# Patient Record
Sex: MALE | Race: WHITE
[De-identification: names, ages, dates, MRNs, and addresses within clinical notes are randomized per-mention and may not be internally consistent; named-entity substitution may affect disease eponyms.]

---

## 2018-09-15 ENCOUNTER — HOSPITAL ENCOUNTER (INPATIENT)
Dept: HOSPITAL 47 - EC | Age: 78
LOS: 2 days | Discharge: HOME | DRG: 310 | End: 2018-09-17
Attending: INTERNAL MEDICINE | Admitting: INTERNAL MEDICINE
Payer: MEDICARE

## 2018-09-15 VITALS — BODY MASS INDEX: 34.9 KG/M2

## 2018-09-15 DIAGNOSIS — J44.9: ICD-10-CM

## 2018-09-15 DIAGNOSIS — Z79.899: ICD-10-CM

## 2018-09-15 DIAGNOSIS — I48.0: Primary | ICD-10-CM

## 2018-09-15 DIAGNOSIS — Z88.8: ICD-10-CM

## 2018-09-15 DIAGNOSIS — E78.5: ICD-10-CM

## 2018-09-15 DIAGNOSIS — Z87.891: ICD-10-CM

## 2018-09-15 DIAGNOSIS — I25.2: ICD-10-CM

## 2018-09-15 DIAGNOSIS — Z79.82: ICD-10-CM

## 2018-09-15 DIAGNOSIS — M06.9: ICD-10-CM

## 2018-09-15 DIAGNOSIS — Z79.01: ICD-10-CM

## 2018-09-15 DIAGNOSIS — Z88.0: ICD-10-CM

## 2018-09-15 DIAGNOSIS — Z86.73: ICD-10-CM

## 2018-09-15 DIAGNOSIS — I10: ICD-10-CM

## 2018-09-15 DIAGNOSIS — Z79.1: ICD-10-CM

## 2018-09-15 LAB
ALBUMIN SERPL-MCNC: 4 G/DL (ref 3.5–5)
ALP SERPL-CCNC: 64 U/L (ref 38–126)
ALT SERPL-CCNC: 36 U/L (ref 21–72)
ANION GAP SERPL CALC-SCNC: 9 MMOL/L
APTT BLD: 21.5 SEC (ref 22–30)
AST SERPL-CCNC: 39 U/L (ref 17–59)
BASOPHILS # BLD AUTO: 0 K/UL (ref 0–0.2)
BASOPHILS NFR BLD AUTO: 1 %
BUN SERPL-SCNC: 23 MG/DL (ref 9–20)
CALCIUM SPEC-MCNC: 9.1 MG/DL (ref 8.4–10.2)
CHLORIDE SERPL-SCNC: 108 MMOL/L (ref 98–107)
CK SERPL-CCNC: 203 U/L (ref 55–170)
CO2 SERPL-SCNC: 23 MMOL/L (ref 22–30)
EOSINOPHIL # BLD AUTO: 0.2 K/UL (ref 0–0.7)
EOSINOPHIL NFR BLD AUTO: 4 %
ERYTHROCYTE [DISTWIDTH] IN BLOOD BY AUTOMATED COUNT: 4.63 M/UL (ref 4.3–5.9)
ERYTHROCYTE [DISTWIDTH] IN BLOOD: 14.2 % (ref 11.5–15.5)
GLUCOSE SERPL-MCNC: 99 MG/DL (ref 74–99)
HCT VFR BLD AUTO: 45.4 % (ref 39–53)
HGB BLD-MCNC: 14.8 GM/DL (ref 13–17.5)
INR PPP: 1.1 (ref ?–1.2)
LYMPHOCYTES # SPEC AUTO: 0.6 K/UL (ref 1–4.8)
LYMPHOCYTES NFR SPEC AUTO: 8 %
MCH RBC QN AUTO: 31.8 PG (ref 25–35)
MCHC RBC AUTO-ENTMCNC: 32.5 G/DL (ref 31–37)
MCV RBC AUTO: 97.9 FL (ref 80–100)
MONOCYTES # BLD AUTO: 0.4 K/UL (ref 0–1)
MONOCYTES NFR BLD AUTO: 7 %
NEUTROPHILS # BLD AUTO: 5.2 K/UL (ref 1.3–7.7)
NEUTROPHILS NFR BLD AUTO: 78 %
PLATELET # BLD AUTO: 196 K/UL (ref 150–450)
POTASSIUM SERPL-SCNC: 4.1 MMOL/L (ref 3.5–5.1)
PROT SERPL-MCNC: 7.3 G/DL (ref 6.3–8.2)
PT BLD: 10.3 SEC (ref 9–12)
SODIUM SERPL-SCNC: 140 MMOL/L (ref 137–145)
TROPONIN I SERPL-MCNC: <0.012 NG/ML (ref 0–0.03)
WBC # BLD AUTO: 6.7 K/UL (ref 3.8–10.6)

## 2018-09-15 PROCEDURE — 85610 PROTHROMBIN TIME: CPT

## 2018-09-15 PROCEDURE — 84484 ASSAY OF TROPONIN QUANT: CPT

## 2018-09-15 PROCEDURE — 80048 BASIC METABOLIC PNL TOTAL CA: CPT

## 2018-09-15 PROCEDURE — 80053 COMPREHEN METABOLIC PANEL: CPT

## 2018-09-15 PROCEDURE — 85025 COMPLETE CBC W/AUTO DIFF WBC: CPT

## 2018-09-15 PROCEDURE — 80061 LIPID PANEL: CPT

## 2018-09-15 PROCEDURE — 96366 THER/PROPH/DIAG IV INF ADDON: CPT

## 2018-09-15 PROCEDURE — 96368 THER/DIAG CONCURRENT INF: CPT

## 2018-09-15 PROCEDURE — 85730 THROMBOPLASTIN TIME PARTIAL: CPT

## 2018-09-15 PROCEDURE — 99285 EMERGENCY DEPT VISIT HI MDM: CPT

## 2018-09-15 PROCEDURE — 71046 X-RAY EXAM CHEST 2 VIEWS: CPT

## 2018-09-15 PROCEDURE — 82553 CREATINE MB FRACTION: CPT

## 2018-09-15 PROCEDURE — 96365 THER/PROPH/DIAG IV INF INIT: CPT

## 2018-09-15 PROCEDURE — 96376 TX/PRO/DX INJ SAME DRUG ADON: CPT

## 2018-09-15 PROCEDURE — 93005 ELECTROCARDIOGRAM TRACING: CPT

## 2018-09-15 PROCEDURE — 82550 ASSAY OF CK (CPK): CPT

## 2018-09-15 PROCEDURE — 36415 COLL VENOUS BLD VENIPUNCTURE: CPT

## 2018-09-15 PROCEDURE — 81001 URINALYSIS AUTO W/SCOPE: CPT

## 2018-09-15 PROCEDURE — 84443 ASSAY THYROID STIM HORMONE: CPT

## 2018-09-15 PROCEDURE — 93306 TTE W/DOPPLER COMPLETE: CPT

## 2018-09-15 RX ADMIN — DILTIAZEM HYDROCHLORIDE SCH MLS/HR: 5 INJECTION INTRAVENOUS at 18:57

## 2018-09-15 RX ADMIN — HEPARIN SODIUM SCH MLS/HR: 5000 INJECTION, SOLUTION INTRAVENOUS at 20:17

## 2018-09-15 NOTE — XR
EXAMINATION TYPE: XR chest 2V

 

DATE OF EXAM: 9/15/2018

 

COMPARISON: 6/13/2014

 

HISTORY: Chest pain

 

TECHNIQUE:  Frontal and lateral views of the chest are obtained.

 

FINDINGS:  Heart and mediastinum are normal. Lungs are clear of consolidation. There is slight blunti
ng of the right costophrenic angle. There is no heart failure. There is spurring in the thoracic spin
e. There are small linear densities at the lung bases.

 

IMPRESSION:  Minimal scarring and subsegmental atelectasis at the lung bases. Minimal right pleural r
eaction. No change.

## 2018-09-15 NOTE — ED
General Adult HPI





- General


Source: patient, family, RN notes reviewed


Mode of arrival: wheelchair


Limitations: no limitations





<Nahun Zamora - Last Filed: 09/15/18 18:46>





<Claudia Augustine - Last Filed: 09/15/18 20:16>





- General


Chief complaint: Chest Pain


Stated complaint: Chest Pain


Time Seen by Provider: 09/15/18 18:38





- History of Present Illness


Initial comments: 





Patient is a pleasant 77-year-old male presenting to the emergency Department 

with complaints of chest discomfort.  Onset of symptoms was around 4 today.  

Patient did have discomfort that was somewhat severe.  Discomfort described as 

an ache with some radiation up to the neck.  Discomfort has improved and is 

mild at this time.  No history of similar symptoms previously.  Patient does 

have history of previous heart attack that he did not go to the hospital 4.  No 

palpitations.  No dyspnea.  No nausea.  No diaphoresis. (Nahun Zamora)





- Related Data


 Home Medications











 Medication  Instructions  Recorded  Confirmed


 


Aspirin 325 mg PO DAILY 06/16/14 06/19/14


 


Atorvastatin [Lipitor] 10 mg PO QAM 06/16/14 06/19/14


 


Folic Acid 800 mg PO DAILY 06/16/14 06/19/14


 


Ibuprofen [Motrin] 800 mg PO TID PRN 06/16/14 06/19/14


 


Lisinopril-Hctz 10-12.5 mg 1 each PO DAILY 06/16/14 06/19/14





[Zestoretic 10-12.5]   


 


amLODIPine [Norvasc] 5 mg PO DAILY 06/16/14 06/19/14











 Allergies











Allergy/AdvReac Type Severity Reaction Status Date / Time


 


Penicillins Allergy  Rash/Hives Verified 09/15/18 18:19














Review of Systems


ROS Other: All systems not noted in ROS Statement are negative.


Constitutional: Denies: fever


Eyes: Denies: eye pain


ENT: Denies: ear pain


Respiratory: Denies: cough, dyspnea


Cardiovascular: Reports: chest pain


Endocrine: Denies: fatigue


Gastrointestinal: Denies: abdominal pain


Genitourinary: Denies: dysuria


Musculoskeletal: Denies: back pain


Skin: Denies: rash


Neurological: Denies: weakness





<Nahun Zamora - Last Filed: 09/15/18 18:46>


ROS Other: All systems not noted in ROS Statement are negative.





<Claudia Augustine P - Last Filed: 09/15/18 20:16>


ROS Statement: 


Those systems with pertinent positive or pertinent negative responses have been 

documented in the HPI.








Past Medical History


Past Medical History: COPD, CVA/TIA, Hyperlipidemia, Hypertension, Rheumatoid 

Arthritis (RA)


Additional Past Medical History / Comment(s): SOB w/activity, having sleep 

study on 6-17-14 @[facility] for possible sleep apnea


History of Any Multi-Drug Resistant Organisms: None Reported


Past Surgical History: Orthopedic Surgery


Additional Past Surgical History / Comment(s): carpal tunnel surg., eye surg.


Past Anesthesia/Blood Transfusion Reactions: No Reported Reaction


Past Psychological History: No Psychological Hx Reported


Smoking Status: Former smoker


Past Alcohol Use History: Occasional


Past Drug Use History: None Reported





<Nahun Zamora Last Filed: 09/15/18 18:46>





General Exam


Limitations: no limitations


General appearance: alert


Head exam: Present: atraumatic


Eye exam: Present: normal appearance, PERRL


ENT exam: Present: normal oropharynx


Neck exam: Present: normal inspection


Respiratory exam: Present: normal lung sounds bilaterally.  Absent: chest wall 

tenderness


Cardiovascular Exam: Present: irregular rhythm


  ** Expanded


Peripheral pulses: 2+: Radial (R), Radial (L), Dorsalis Pedis (R), Dorsalis 

Pedis (L)


GI/Abdominal exam: Present: soft.  Absent: tenderness


Extremities exam: Present: normal inspection.  Absent: pedal edema, calf 

tenderness


Neurological exam: Present: alert


Psychiatric exam: Present: normal affect, normal mood


Skin exam: Present: normal color





<Nahun Zamora Last Filed: 09/15/18 18:46>


 Vital Signs











  09/15/18 09/15/18 09/15/18





  18:20 18:23 19:00


 


Temperature  98.2 F 


 


Pulse Rate 92  100


 


Respiratory 18  17





Rate   


 


Blood Pressure 168/105  154/105


 


O2 Sat by Pulse 94 L  98





Oximetry   














  09/15/18 09/15/18 09/15/18





  19:42 19:51 19:54


 


Temperature   


 


Pulse Rate 98 102 H 99


 


Respiratory 20 18 20





Rate   


 


Blood Pressure 161/95 177/86 177/96


 


O2 Sat by Pulse 97 96 97





Oximetry   














EKG Findings





- EKG Comments:


EKG Findings:: A. fib with RVR, rate 110.  QRS 90.  .  .  Normal 

axis.  Normal QRS.  Nonspecific ST-T.





<Zamora,Nahun - Last Filed: 09/15/18 18:46>





Medical Decision Making





<Nahun Zamora - Last Filed: 09/15/18 18:46>





- Lab Data


Result diagrams: 


 09/15/18 18:37





 09/15/18 18:37





<Claudia Augustine - Last Filed: 09/15/18 20:16>





- Medical Decision Making


She care was signed out to me by Dr. Zamora, patient presented with chest 

pressure and new onset A. fib.  Troponin was negative.  Low-dose heparin was 

initiated.  Patient's heart rate remained controlled on low dose of Cardizem.  

Results were discussed with the patient and patient was agreeable to plan for 

admission.  Patient care was discussed with Dr. Tse who accepts admission 

with consult to Cardiology and Echo ordered. Orders placed. 


 (Claudia Augustine)





- Lab Data


 Lab Results











  09/15/18 09/15/18 09/15/18 Range/Units





  18:37 18:37 18:37 


 


WBC   6.7   (3.8-10.6)  k/uL


 


RBC   4.63   (4.30-5.90)  m/uL


 


Hgb   14.8   (13.0-17.5)  gm/dL


 


Hct   45.4   (39.0-53.0)  %


 


MCV   97.9   (80.0-100.0)  fL


 


MCH   31.8   (25.0-35.0)  pg


 


MCHC   32.5   (31.0-37.0)  g/dL


 


RDW   14.2   (11.5-15.5)  %


 


Plt Count   196   (150-450)  k/uL


 


Neutrophils %   78   %


 


Lymphocytes %   8   %


 


Monocytes %   7   %


 


Eosinophils %   4   %


 


Basophils %   1   %


 


Neutrophils #   5.2   (1.3-7.7)  k/uL


 


Lymphocytes #   0.6 L   (1.0-4.8)  k/uL


 


Monocytes #   0.4   (0-1.0)  k/uL


 


Eosinophils #   0.2   (0-0.7)  k/uL


 


Basophils #   0.0   (0-0.2)  k/uL


 


PT     (9.0-12.0)  sec


 


INR     (<1.2)  


 


APTT     (22.0-30.0)  sec


 


Sodium    140  (137-145)  mmol/L


 


Potassium    4.1  (3.5-5.1)  mmol/L


 


Chloride    108 H  ()  mmol/L


 


Carbon Dioxide    23  (22-30)  mmol/L


 


Anion Gap    9  mmol/L


 


BUN    23 H  (9-20)  mg/dL


 


Creatinine    0.89  (0.66-1.25)  mg/dL


 


Est GFR (CKD-EPI)AfAm    >90  (>60 ml/min/1.73 sqM)  


 


Est GFR (CKD-EPI)NonAf    83  (>60 ml/min/1.73 sqM)  


 


Glucose    99  (74-99)  mg/dL


 


Calcium    9.1  (8.4-10.2)  mg/dL


 


Total Bilirubin    0.4  (0.2-1.3)  mg/dL


 


AST    39  (17-59)  U/L


 


ALT    36  (21-72)  U/L


 


Alkaline Phosphatase    64  ()  U/L


 


Total Creatine Kinase  203 H    ()  U/L


 


CK-MB (CK-2)  2.7 H    (0.0-2.4)  ng/mL


 


CK-MB (CK-2) Rel Index  1.3    


 


Troponin I  <0.012    (0.000-0.034)  ng/mL


 


Total Protein    7.3  (6.3-8.2)  g/dL


 


Albumin    4.0  (3.5-5.0)  g/dL














  09/15/18 Range/Units





  18:37 


 


WBC   (3.8-10.6)  k/uL


 


RBC   (4.30-5.90)  m/uL


 


Hgb   (13.0-17.5)  gm/dL


 


Hct   (39.0-53.0)  %


 


MCV   (80.0-100.0)  fL


 


MCH   (25.0-35.0)  pg


 


MCHC   (31.0-37.0)  g/dL


 


RDW   (11.5-15.5)  %


 


Plt Count   (150-450)  k/uL


 


Neutrophils %   %


 


Lymphocytes %   %


 


Monocytes %   %


 


Eosinophils %   %


 


Basophils %   %


 


Neutrophils #   (1.3-7.7)  k/uL


 


Lymphocytes #   (1.0-4.8)  k/uL


 


Monocytes #   (0-1.0)  k/uL


 


Eosinophils #   (0-0.7)  k/uL


 


Basophils #   (0-0.2)  k/uL


 


PT  10.3  (9.0-12.0)  sec


 


INR  1.1  (<1.2)  


 


APTT  21.5 L  (22.0-30.0)  sec


 


Sodium   (137-145)  mmol/L


 


Potassium   (3.5-5.1)  mmol/L


 


Chloride   ()  mmol/L


 


Carbon Dioxide   (22-30)  mmol/L


 


Anion Gap   mmol/L


 


BUN   (9-20)  mg/dL


 


Creatinine   (0.66-1.25)  mg/dL


 


Est GFR (CKD-EPI)AfAm   (>60 ml/min/1.73 sqM)  


 


Est GFR (CKD-EPI)NonAf   (>60 ml/min/1.73 sqM)  


 


Glucose   (74-99)  mg/dL


 


Calcium   (8.4-10.2)  mg/dL


 


Total Bilirubin   (0.2-1.3)  mg/dL


 


AST   (17-59)  U/L


 


ALT   (21-72)  U/L


 


Alkaline Phosphatase   ()  U/L


 


Total Creatine Kinase   ()  U/L


 


CK-MB (CK-2)   (0.0-2.4)  ng/mL


 


CK-MB (CK-2) Rel Index   


 


Troponin I   (0.000-0.034)  ng/mL


 


Total Protein   (6.3-8.2)  g/dL


 


Albumin   (3.5-5.0)  g/dL














Disposition





<Nahun Zamora - Last Filed: 09/15/18 18:46>





<Claudia Augustine - Last Filed: 09/15/18 20:16>


Clinical Impression: 


 Atrial fibrillation with RVR, Chest pain





Disposition: ADMITTED AS IP TO THIS HOSP


Condition: Stable


Referrals: 


Vamsi Smith MD [Primary Care Provider] - 1-2 days

## 2018-09-16 LAB
BASOPHILS # BLD AUTO: 0 K/UL (ref 0–0.2)
BASOPHILS # BLD AUTO: 0.1 K/UL (ref 0–0.2)
BASOPHILS NFR BLD AUTO: 1 %
BASOPHILS NFR BLD AUTO: 1 %
CHOLEST SERPL-MCNC: 193 MG/DL (ref ?–200)
CK SERPL-CCNC: 170 U/L (ref 55–170)
CK SERPL-CCNC: 177 U/L (ref 55–170)
EOSINOPHIL # BLD AUTO: 0.3 K/UL (ref 0–0.7)
EOSINOPHIL # BLD AUTO: 0.3 K/UL (ref 0–0.7)
EOSINOPHIL NFR BLD AUTO: 4 %
EOSINOPHIL NFR BLD AUTO: 4 %
ERYTHROCYTE [DISTWIDTH] IN BLOOD BY AUTOMATED COUNT: 4.36 M/UL (ref 4.3–5.9)
ERYTHROCYTE [DISTWIDTH] IN BLOOD BY AUTOMATED COUNT: 4.46 M/UL (ref 4.3–5.9)
ERYTHROCYTE [DISTWIDTH] IN BLOOD: 14 % (ref 11.5–15.5)
ERYTHROCYTE [DISTWIDTH] IN BLOOD: 14 % (ref 11.5–15.5)
GLUCOSE BLD-MCNC: 105 MG/DL (ref 75–99)
GLUCOSE BLD-MCNC: 91 MG/DL (ref 75–99)
HCT VFR BLD AUTO: 43 % (ref 39–53)
HCT VFR BLD AUTO: 43.7 % (ref 39–53)
HDLC SERPL-MCNC: 48 MG/DL (ref 40–60)
HGB BLD-MCNC: 14.2 GM/DL (ref 13–17.5)
HGB BLD-MCNC: 14.2 GM/DL (ref 13–17.5)
LDLC SERPL CALC-MCNC: 132 MG/DL (ref 0–99)
LYMPHOCYTES # SPEC AUTO: 0.9 K/UL (ref 1–4.8)
LYMPHOCYTES # SPEC AUTO: 0.9 K/UL (ref 1–4.8)
LYMPHOCYTES NFR SPEC AUTO: 14 %
LYMPHOCYTES NFR SPEC AUTO: 15 %
MCH RBC QN AUTO: 31.9 PG (ref 25–35)
MCH RBC QN AUTO: 32.6 PG (ref 25–35)
MCHC RBC AUTO-ENTMCNC: 32.6 G/DL (ref 31–37)
MCHC RBC AUTO-ENTMCNC: 33.1 G/DL (ref 31–37)
MCV RBC AUTO: 98 FL (ref 80–100)
MCV RBC AUTO: 98.5 FL (ref 80–100)
MONOCYTES # BLD AUTO: 0.5 K/UL (ref 0–1)
MONOCYTES # BLD AUTO: 0.5 K/UL (ref 0–1)
MONOCYTES NFR BLD AUTO: 8 %
MONOCYTES NFR BLD AUTO: 8 %
NEUTROPHILS # BLD AUTO: 4 K/UL (ref 1.3–7.7)
NEUTROPHILS # BLD AUTO: 4.6 K/UL (ref 1.3–7.7)
NEUTROPHILS NFR BLD AUTO: 69 %
NEUTROPHILS NFR BLD AUTO: 70 %
PH UR: 5.5 [PH] (ref 5–8)
PLATELET # BLD AUTO: 174 K/UL (ref 150–450)
PLATELET # BLD AUTO: 176 K/UL (ref 150–450)
RBC UR QL: 5 /HPF (ref 0–5)
SP GR UR: 1.01 (ref 1–1.03)
TRIGL SERPL-MCNC: 67 MG/DL (ref ?–150)
TROPONIN I SERPL-MCNC: 0.02 NG/ML (ref 0–0.03)
TROPONIN I SERPL-MCNC: 0.02 NG/ML (ref 0–0.03)
UROBILINOGEN UR QL STRIP: <2 MG/DL (ref ?–2)
WBC # BLD AUTO: 5.8 K/UL (ref 3.8–10.6)
WBC # BLD AUTO: 6.6 K/UL (ref 3.8–10.6)
WBC #/AREA URNS HPF: 1 /HPF (ref 0–5)

## 2018-09-16 RX ADMIN — METOPROLOL TARTRATE SCH MG: 25 TABLET, FILM COATED ORAL at 21:02

## 2018-09-16 RX ADMIN — ATORVASTATIN CALCIUM SCH MG: 10 TABLET, FILM COATED ORAL at 07:53

## 2018-09-16 RX ADMIN — DILTIAZEM HYDROCHLORIDE SCH MLS/HR: 5 INJECTION INTRAVENOUS at 03:30

## 2018-09-16 RX ADMIN — ATORVASTATIN CALCIUM SCH: 10 TABLET, FILM COATED ORAL at 07:56

## 2018-09-16 RX ADMIN — METOPROLOL TARTRATE SCH MG: 25 TABLET, FILM COATED ORAL at 10:56

## 2018-09-16 RX ADMIN — HEPARIN SODIUM SCH MLS/HR: 5000 INJECTION, SOLUTION INTRAVENOUS at 17:24

## 2018-09-16 RX ADMIN — HEPARIN SODIUM PRN UNIT: 5000 INJECTION, SOLUTION INTRAVENOUS; SUBCUTANEOUS at 01:51

## 2018-09-16 NOTE — P.HPIM
History of Present Illness


77-year-old pleasant gentleman came in with compensative chest discomfort 

restarted yesterday across the chest radiating to the neck area.  Constant 

moderate.  Patient denied any fever chills diaphoresis patient's chest pain is 

nonpleuritic not associated with food.  Patient is found to be in atrial 

fibrillation patient and dysuria cough runny nose abdominal pain diarrhea 

patient does not have any other signs or symptoms of sepsis at this time.  

Patient was initially on Cardizem which was switched to metoprolol patient had 

a cardiac cath that 6 years ago which was essentially within normal limits.  

Chest x-ray did not show any pulmonary edema.  Chest pain is associated with 

shortness of breath, no palpitations








Review of Systems


REVIEW OF SYSTEMS: 


CONSTITUTIONAL: No fever, no malaise, no fatigue. 


HEENT: No recent visual problems or hearing problems. Denied any sore throat. 


CARDIOVASCULAR: no palpitations, no syncope. 


PULMONARY:  no cough, no hemoptysis. 


GASTROINTESTINAL: No diarrhea, no nausea, no vomiting, no abdominal pain. 

Normoactive bowel sounds. 


NEUROLOGICAL: No headaches, no weakness, no numbness. 


HEMATOLOGICAL: Denies any bleeding or petechiae. 


GENITOURINARY: Denies any burning micturition, frequency, or urgency. 


MUSCULOSKELETAL/RHEUMATOLOGICAL: Denies any joint pain, swelling, or any muscle 

pain. 


ENDOCRINE: Denies any polyuria or polydipsia. 





The rest of the 14-point review of systems is negative.











Past Medical History


Past Medical History: COPD, CVA/TIA, Hyperlipidemia, Hypertension, Rheumatoid 

Arthritis (RA)


Additional Past Medical History / Comment(s): SOB w/activity, having sleep 

study on 6-17-14 @[facility] for possible sleep apnea


History of Any Multi-Drug Resistant Organisms: None Reported


Past Surgical History: Orthopedic Surgery


Additional Past Surgical History / Comment(s): carpal tunnel surg., eye surg.


Past Anesthesia/Blood Transfusion Reactions: No Reported Reaction


Past Psychological History: No Psychological Hx Reported


Smoking Status: Former smoker


Past Alcohol Use History: Occasional


Past Drug Use History: None Reported





Medications and Allergies


 Home Medications











 Medication  Instructions  Recorded  Confirmed  Type


 


Aspirin 325 mg PO DAILY 06/16/14 09/16/18 History


 


Folic Acid 800 mg PO DAILY 06/16/14 09/16/18 History


 


Ibuprofen [Motrin] 800 mg PO TID PRN 06/16/14 09/16/18 History


 


amLODIPine [Norvasc] 5 mg PO DAILY 06/16/14 09/16/18 History


 


Latanoprost/Pf [Latanoprost 0.005% 1 drop BOTH EYES HS 09/16/18 09/16/18 History





Eye Drop]    


 


Lutein 10 mg PO DAILY 09/16/18 09/16/18 History


 


Methotrexate/Pf [Rasuvo 7.5 15 mg SQ WE 09/16/18 09/16/18 History





mg/0.15 ml Autoinj]    


 


predniSONE 5 mg PO DAILY 09/16/18 09/16/18 History











 Allergies











Allergy/AdvReac Type Severity Reaction Status Date / Time


 


Penicillins Allergy  Rash/Hives Verified 09/15/18 18:19


 


atorvastatin [From Lipitor] AdvReac  Unknown Verified 09/16/18 09:19














Physical Exam


Vitals: 


 Vital Signs











  Temp Pulse Pulse Pulse Resp BP BP


 


 09/16/18 11:56      18  


 


 09/16/18 11:03  94 F L   80   18   127/68


 


 09/16/18 08:00      18  


 


 09/16/18 07:58  96.7 F L   86   18   103/63


 


 09/16/18 03:11  98.0 F   72   18   128/72


 


 09/16/18 00:00  97.5 F L    96  18   132/68


 


 09/15/18 20:31  97.1 F L    95  20   121/74


 


 09/15/18 20:00  98 F  100    18  174/84 


 


 09/15/18 19:54   99    20  177/96 


 


 09/15/18 19:51   102 H    18  177/86 


 


 09/15/18 19:42   98    20  161/95 


 


 09/15/18 19:00   100    17  154/105 


 


 09/15/18 18:23  98.2 F      


 


 09/15/18 18:20   92    18  168/105 














  Pulse Ox


 


 09/16/18 11:56 


 


 09/16/18 11:03  93 L


 


 09/16/18 08:00 


 


 09/16/18 07:58  92 L


 


 09/16/18 03:11  95


 


 09/16/18 00:00  96


 


 09/15/18 20:31  94 L


 


 09/15/18 20:00  97


 


 09/15/18 19:54  97


 


 09/15/18 19:51  96


 


 09/15/18 19:42  97


 


 09/15/18 19:00  98


 


 09/15/18 18:23 


 


 09/15/18 18:20  94 L








 Intake and Output











 09/15/18 09/16/18 09/16/18





 22:59 06:59 14:59


 


Intake Total  154.137 


 


Balance  154.137 


 


Intake:   


 


  Intake, IV Titration  154.137 





  Amount   


 


    Diltiazem 50 mg In Sodium  42.75 





    Chloride 0.9% 40 ml @ 5   





    MG/HR 5 mls/hr IV .Q10H   





    SOP[facility] Rx#:[id number]   


 


    Heparin Sod,Pork in 0.45%  111.387 





    NaCl 25,000 unit In 0.45   





    % NaCl 1 500ml.bag @ 8.3   





    UNITS/KG/HR 19.95 mls/hr   





    IV .Q24H SOPHIE Rx#:   





    [id number]   


 


Other:   


 


  Voiding Method  Toilet 





  Urinal 


 


  # Voids 1 1 


 


  Weight 120.202 kg 116.1 kg 











PHYSICAL EXAMINATION: 





GENERAL: The patient is alert and oriented x3, not in any acute distress. Well 

developed, well nourished. 


HEENT: Pupils are round and equally reacting to light. EOMI. No scleral 

icterus. No conjunctival pallor. Normocephalic, atraumatic. No pharyngeal 

erythema. No thyromegaly. 


CARDIOVASCULAR: S1 and S2 present. No murmurs, rubs, or gallops. 


PULMONARY: Chest is clear to auscultation, no wheezing or crackles. 


ABDOMEN: Soft, nontender, nondistended, normoactive bowel sounds. No palpable 

organomegaly. 


MUSCULOSKELETAL: No joint swelling or deformity.


EXTREMITIES: No cyanosis, clubbing, or pedal edema. 


NEUROLOGICAL: Gross neurological examination did not reveal any focal deficits. 


SKIN: No rashes. 











Results


CBC & Chem 7: 


 09/16/18 07:41





 09/15/18 18:37


Labs: 


 Abnormal Lab Results - Last 24 Hours (Table)











  09/15/18 09/15/18 09/15/18 Range/Units





  18:37 18:37 18:37 


 


Lymphocytes #   0.6 L   (1.0-4.8)  k/uL


 


APTT     (22.0-30.0)  sec


 


Chloride    108 H  ()  mmol/L


 


BUN    23 H  (9-20)  mg/dL


 


Total Creatine Kinase  203 H    ()  U/L


 


CK-MB (CK-2)  2.7 H    (0.0-2.4)  ng/mL


 


LDL Cholesterol, Calc     (0-99)  mg/dL














  09/15/18 09/16/18 09/16/18 Range/Units





  18:37 01:15 01:15 


 


Lymphocytes #   0.9 L   (1.0-4.8)  k/uL


 


APTT  21.5 L    (22.0-30.0)  sec


 


Chloride     ()  mmol/L


 


BUN     (9-20)  mg/dL


 


Total Creatine Kinase     ()  U/L


 


CK-MB (CK-2)    2.5 H  (0.0-2.4)  ng/mL


 


LDL Cholesterol, Calc     (0-99)  mg/dL














  09/16/18 09/16/18 09/16/18 Range/Units





  01:15 07:41 07:41 


 


Lymphocytes #    0.9 L  (1.0-4.8)  k/uL


 


APTT  31.8 H    (22.0-30.0)  sec


 


Chloride     ()  mmol/L


 


BUN     (9-20)  mg/dL


 


Total Creatine Kinase   177 H   ()  U/L


 


CK-MB (CK-2)   3.4 H   (0.0-2.4)  ng/mL


 


LDL Cholesterol, Calc     (0-99)  mg/dL














  09/16/18 09/16/18 Range/Units





  07:41 07:41 


 


Lymphocytes #    (1.0-4.8)  k/uL


 


APTT   60.0 H  (22.0-30.0)  sec


 


Chloride    ()  mmol/L


 


BUN    (9-20)  mg/dL


 


Total Creatine Kinase    ()  U/L


 


CK-MB (CK-2)    (0.0-2.4)  ng/mL


 


LDL Cholesterol, Calc  132 H   (0-99)  mg/dL














Thrombosis Risk Factor Assmnt





- Choose All That Apply


Any of the Below Risk Factors Present?: Yes


Each Factor Represents 1 point: Obesity (BMI >25)


Other Risk Factors: Yes


Each Risk Factor Represents 3 Points: Age 75 years or older


Thrombosis Risk Factor Assessment Total Risk Factor Score: 4


Thrombosis Risk Factor Assessment Level: Moderate Risk





Assessment and Plan


Plan: 


-New-onset atrial fibrillation: Patient is on anticoagulation with heparin will 

need to be switched to oral patient was started on metoprolol.  Patient had a 

history of bradycardia at 20 years ago.


-Chest pain: Probably secondary to atrial fibrillation cardiac enzymes are 

essentially within normal limits.


-Hypertension continue with amlodipine


-Rheumatoid arthritis


-dyslipidemia


-History of CVA in the past

## 2018-09-16 NOTE — P.CRDCN
History of Present Illness


History of present illness: 





This is Dr. Florez dictating a consult on this patient


The patient was interviewed and examined by me





IMPRESSION / ASSESSMENT: 


Patient admitted with chest discomfort lasting about an hour, continuous with 

normal cardiac enzymes that woke him up from sleep


Newly diagnosed atrial fibrillation with RVR but denies palpitations or 

shortness of breath and resting comfortably in bed at this time supine


History of hypertension on amlodipine and Zestoretic


History of dyslipidemia on statins


Normal TSH


Past history of CVA and he did not seek medical attention at that time





PLAN:


Rate controlled atrial fibrillation with metoprolol.  DC IV Cardizem today


Continue IV heparin


2-D echo and Doppler study to assess cardiac structure and function


Increase atorvastatin to 20 mg by mouth daily


Lipid panel shows LDL of 1 32 mg/dL





HPI


77 year old male patient who woke up in the middle of the night with chest 

discomfort.  The pain radiated up into the neck and it lasted for about an hour 

or possibly more and hence he came to the hospital.  Denied palpitations 

shortness of breath or any other associated symptoms


3 cardiac enzymes are normal but he was noted to be in atrial fibrillation 

which is a new diagnosis for him





Several years back he had a stroke and last motor function in his upper 

extremity and this gradually resolved.  He did not seek medical attention at 

that time





Impression of Dr. Smith


History of hypertension on Zestoretic 10/12.5 g by mouth daily as well as 

amlodipine 5 mg daily.  Also takes atorvastatin 10 mg daily for dyslipidemia





ROS:


No fever chills or rigors, 


no cough, phlegm or expectoration, 


no nausea, vomiting or diarrhea, 


no hematuria, dysuria, 


no musculoskeletal complaints, 


no strokes or seizures, 


no skin lesions.





EXAMINATION


Rhythm is irregular.  Breath sounds are normal no rhonchi no crackles


Heart sounds S1 and S2 are irregular but normal no murmurs no gallops no rub


Abdomen is soft nontender


Extended is warm no edema


Patient is lying supine in bed and looks comfortable


Blood pressure 128/72 mmHg pulse rate in the 80s on IV Cardizem afebrile 96.7F 

normal respirations nonlabored





REVIEW OF LABS, ECG


Hemoglobin 14.2, alert lites normal, renal function normal, liver functions 

normal


TSH 2.0


, HDL 48, total cholesterol 193, triglycerides 67


Elevated CPK but normal troponins





Twelve-lead ECG shows atrial fibrillation with  beats a minute, newly 

diagnosed
























































Past Medical History


Past Medical History: COPD, CVA/TIA, Hyperlipidemia, Hypertension, Rheumatoid 

Arthritis (RA)


Additional Past Medical History / Comment(s): SOB w/activity, having sleep 

study on 6-17-14 @[facility] for possible sleep apnea


History of Any Multi-Drug Resistant Organisms: None Reported


Past Surgical History: Orthopedic Surgery


Additional Past Surgical History / Comment(s): carpal tunnel surg., eye surg.


Past Anesthesia/Blood Transfusion Reactions: No Reported Reaction


Past Psychological History: No Psychological Hx Reported


Smoking Status: Former smoker


Past Alcohol Use History: Occasional


Past Drug Use History: None Reported





Medications and Allergies


 Home Medications











 Medication  Instructions  Recorded  Confirmed  Type


 


Aspirin 325 mg PO DAILY 06/16/14 09/16/18 History


 


Folic Acid 800 mg PO DAILY 06/16/14 09/16/18 History


 


Ibuprofen [Motrin] 800 mg PO TID PRN 06/16/14 09/16/18 History


 


amLODIPine [Norvasc] 5 mg PO DAILY 06/16/14 09/16/18 History


 


Abatacept [Orencia] 125 mg SQ WEEKLY 09/16/18 09/16/18 History


 


Latanoprost/Pf [Latanoprost 0.005% 1 drop BOTH EYES HS 09/16/18 09/16/18 History





Eye Drop]    


 


Lutein 10 mg PO DAILY 09/16/18 09/16/18 History


 


Methotrexate/Pf [Rasuvo 7.5 15 mg SQ WEEKLY 09/16/18 09/16/18 History





mg/0.15 ml Autoinj]    











 Allergies











Allergy/AdvReac Type Severity Reaction Status Date / Time


 


Penicillins Allergy  Rash/Hives Verified 09/15/18 18:19


 


atorvastatin [From Lipitor] AdvReac  Unknown Verified 09/16/18 09:19














Physical Exam


Vitals: 


 Vital Signs











  Temp Pulse Pulse Pulse Resp BP BP


 


 09/16/18 08:00      18  


 


 09/16/18 07:58  96.7 F L   86   18   103/63


 


 09/16/18 03:11  98.0 F   72   18   128/72


 


 09/16/18 00:00  97.5 F L    96  18   132/68


 


 09/15/18 20:31  97.1 F L    95  20   121/74


 


 09/15/18 20:00  98 F  100    18  174/84 


 


 09/15/18 19:54   99    20  177/96 


 


 09/15/18 19:51   102 H    18  177/86 


 


 09/15/18 19:42   98    20  161/95 


 


 09/15/18 19:00   100    17  154/105 


 


 09/15/18 18:23  98.2 F      


 


 09/15/18 18:20   92    18  168/105 














  Pulse Ox


 


 09/16/18 08:00 


 


 09/16/18 07:58  92 L


 


 09/16/18 03:11  95


 


 09/16/18 00:00  96


 


 09/15/18 20:31  94 L


 


 09/15/18 20:00  97


 


 09/15/18 19:54  97


 


 09/15/18 19:51  96


 


 09/15/18 19:42  97


 


 09/15/18 19:00  98


 


 09/15/18 18:23 


 


 09/15/18 18:20  94 L








 Intake and Output











 09/15/18 09/16/18 09/16/18





 22:59 06:59 14:59


 


Intake Total  154.137 


 


Balance  154.137 


 


Intake:   


 


  Intake, IV Titration  154.137 





  Amount   


 


    Diltiazem 50 mg In Sodium  42.75 





    Chloride 0.9% 40 ml @ 5   





    MG/HR 5 mls/hr IV .Q10H   





    [facility] Rx#:[id number]   


 


    Heparin Sod,Pork in 0.45%  111.387 





    NaCl 25,000 unit In 0.45   





    % NaCl 1 500ml.bag @ 8.3   





    UNITS/KG/HR 19.95 mls/hr   





    IV .Q24H [facility] Rx#:   





    [id number]   


 


Other:   


 


  Voiding Method  Toilet 





  Urinal 


 


  # Voids 1 1 


 


  Weight 120.202 kg 116.1 kg 














Results





 09/16/18 07:41





 09/15/18 18:37


 Cardiac Enzymes











  09/15/18 09/15/18 09/16/18 Range/Units





  18:37 18:37 01:15 


 


AST   39   (17-59)  U/L


 


CK-MB (CK-2)  2.7 H   2.5 H  (0.0-2.4)  ng/mL


 


Troponin I  <0.012   0.016  (0.000-0.034)  ng/mL














  09/16/18 Range/Units





  07:41 


 


AST   (17-59)  U/L


 


CK-MB (CK-2)  3.4 H  (0.0-2.4)  ng/mL


 


Troponin I  0.023  (0.000-0.034)  ng/mL








 Coagulation











  09/15/18 09/16/18 09/16/18 Range/Units





  18:37 01:15 07:41 


 


PT  10.3    (9.0-12.0)  sec


 


APTT  21.5 L  31.8 H  60.0 H  (22.0-30.0)  sec








 Lipids











  09/16/18 Range/Units





  07:41 


 


Triglycerides  67  (<150)  mg/dL


 


Cholesterol  193  (<200)  mg/dL


 


HDL Cholesterol  48  (40-60)  mg/dL








 CBC











  09/15/18 09/16/18 09/16/18 Range/Units





  18:37 01:15 07:41 


 


WBC  6.7  5.8  6.6  (3.8-10.6)  k/uL


 


RBC  4.63  4.36  4.46  (4.30-5.90)  m/uL


 


Hgb  14.8  14.2  14.2  (13.0-17.5)  gm/dL


 


Hct  45.4  43.0  43.7  (39.0-53.0)  %


 


Plt Count  196  174  176  (150-450)  k/uL








 Comprehensive Metabolic Panel











  09/15/18 Range/Units





  18:37 


 


Sodium  140  (137-145)  mmol/L


 


Potassium  4.1  (3.5-5.1)  mmol/L


 


Chloride  108 H  ()  mmol/L


 


Carbon Dioxide  23  (22-30)  mmol/L


 


BUN  23 H  (9-20)  mg/dL


 


Creatinine  0.89  (0.66-1.25)  mg/dL


 


Glucose  99  (74-99)  mg/dL


 


Calcium  9.1  (8.4-10.2)  mg/dL


 


AST  39  (17-59)  U/L


 


ALT  36  (21-72)  U/L


 


Alkaline Phosphatase  64  ()  U/L


 


Total Protein  7.3  (6.3-8.2)  g/dL


 


Albumin  4.0  (3.5-5.0)  g/dL








 Current Medications











Generic Name Dose Route Start Last Admin





  Trade Name Freq  PRN Reason Stop Dose Admin


 


Aspirin  81 mg  09/17/18 09:00  





  Aspirin  PO   





  DAILY SOPHIE   





     





     





     





     


 


Atorvastatin Calcium  20 mg  09/17/18 09:00  





  Lipitor  PO   





  QAM SOPHIE   





     





     





     





     


 


Lisinopril/HCTZ  1 each  09/16/18 09:00  09/16/18 07:53





  Zestoretic 10-12.5  PO   1 each





  DAILY SOPHIE   Administration





     





     





     





     


 


Heparin Sodium (Porcine)  0 unit  09/15/18 19:59  09/16/18 01:51





  Heparin  IV   6,000 unit





  PER PROTOCOL PRN   Administration





  Low PTT   





     





  Protocol   





     


 


Heparin Sodium/Sodium Chloride  500 mls @ 19.95 mls/hr  09/15/18 20:00  09/16/ 18 01:52





  25,000 unit/ Sodium Chloride  IV   11.3 units/kg/hr





  .Q24H SOPHIE   27.16 mls/hr





     Titration





     





  Protocol   





  8.3 UNITS/KG/HR   


 


Metoprolol Tartrate  25 mg  09/16/18 10:30  





  Lopressor  PO   





  BID SOPHIE   





     





     





     





     


 


Nitroglycerin  0.4 mg  09/15/18 19:57  





  Nitrostat  SUBLINGUAL   





  Q5M PRN   





  Chest Pain   





     





     





     








 Intake and Output











 09/15/18 09/16/18 09/16/18





 22:59 06:59 14:59


 


Intake Total  154.137 


 


Balance  154.137 


 


Intake:   


 


  Intake, IV Titration  154.137 





  Amount   


 


    Diltiazem 50 mg In Sodium  42.75 





    Chloride 0.9% 40 ml @ 5   





    MG/HR 5 mls/hr IV .Q10H   





    [facility] Rx#:[id number]   


 


    Heparin Sod,Pork in 0.45%  111.387 





    NaCl 25,000 unit In 0.45   





    % NaCl 1 500ml.bag @ 8.3   





    UNITS/KG/HR 19.95 mls/hr   





    IV .Q24H [facility] Rx#:   





    [id number]   


 


Other:   


 


  Voiding Method  Toilet 





  Urinal 


 


  # Voids 1 1 


 


  Weight 120.202 kg 116.1 kg 








 





 09/16/18 07:41 





 09/15/18 18:37

## 2018-09-17 VITALS — SYSTOLIC BLOOD PRESSURE: 133 MMHG | DIASTOLIC BLOOD PRESSURE: 76 MMHG | HEART RATE: 54 BPM

## 2018-09-17 VITALS — RESPIRATION RATE: 17 BRPM | TEMPERATURE: 97.4 F

## 2018-09-17 LAB
ANION GAP SERPL CALC-SCNC: 9 MMOL/L
BASOPHILS # BLD AUTO: 0 K/UL (ref 0–0.2)
BASOPHILS NFR BLD AUTO: 1 %
BUN SERPL-SCNC: 23 MG/DL (ref 9–20)
CALCIUM SPEC-MCNC: 8.6 MG/DL (ref 8.4–10.2)
CHLORIDE SERPL-SCNC: 107 MMOL/L (ref 98–107)
CO2 SERPL-SCNC: 22 MMOL/L (ref 22–30)
EOSINOPHIL # BLD AUTO: 0.3 K/UL (ref 0–0.7)
EOSINOPHIL NFR BLD AUTO: 4 %
ERYTHROCYTE [DISTWIDTH] IN BLOOD BY AUTOMATED COUNT: 4.54 M/UL (ref 4.3–5.9)
ERYTHROCYTE [DISTWIDTH] IN BLOOD: 14.4 % (ref 11.5–15.5)
GLUCOSE SERPL-MCNC: 96 MG/DL (ref 74–99)
HCT VFR BLD AUTO: 45.7 % (ref 39–53)
HGB BLD-MCNC: 14.5 GM/DL (ref 13–17.5)
LYMPHOCYTES # SPEC AUTO: 1 K/UL (ref 1–4.8)
LYMPHOCYTES NFR SPEC AUTO: 17 %
MCH RBC QN AUTO: 32 PG (ref 25–35)
MCHC RBC AUTO-ENTMCNC: 31.8 G/DL (ref 31–37)
MCV RBC AUTO: 100.7 FL (ref 80–100)
MONOCYTES # BLD AUTO: 0.4 K/UL (ref 0–1)
MONOCYTES NFR BLD AUTO: 8 %
NEUTROPHILS # BLD AUTO: 3.9 K/UL (ref 1.3–7.7)
NEUTROPHILS NFR BLD AUTO: 67 %
PLATELET # BLD AUTO: 180 K/UL (ref 150–450)
POTASSIUM SERPL-SCNC: 4.1 MMOL/L (ref 3.5–5.1)
SODIUM SERPL-SCNC: 138 MMOL/L (ref 137–145)
WBC # BLD AUTO: 5.8 K/UL (ref 3.8–10.6)

## 2018-09-17 RX ADMIN — HEPARIN SODIUM PRN UNIT: 5000 INJECTION, SOLUTION INTRAVENOUS; SUBCUTANEOUS at 06:50

## 2018-09-17 RX ADMIN — METOPROLOL TARTRATE SCH MG: 25 TABLET, FILM COATED ORAL at 08:45

## 2018-09-17 NOTE — P.DS
Providers


Date of admission: 


09/15/18 20:00





Attending physician: 


Milagros Tse





Consults: 





 





09/15/18 19:57


Consult Physician Urgent 


   Consulting Provider: [facility]


   Consult Reason/Comments: new afib


   Do you want consulting provider notified?: Yes, Notify in am











[facility] 


[facility]





[facility] Course: 





77-year-old gentleman was admitted with new-onset atrial fibrillation.  Patient 

is presently sinus rhythm patient was started on beta blocker and 

anticoagulation with Eliquis patient will be discharged today in stable medical 

condition to home.  Patient is feeling much better today.





PHYSICAL EXAMINATION: 





GENERAL: The patient is alert and oriented x3, not in any acute distress. Well 

developed, well nourished. 


HEENT: Pupils are round and equally reacting to light. EOMI. No scleral 

icterus. No conjunctival pallor. Normocephalic, atraumatic. No pharyngeal 

erythema. No thyromegaly. 


CARDIOVASCULAR: S1 and S2 present. No murmurs, rubs, or gallops. 


PULMONARY: Chest is clear to auscultation, no wheezing or crackles. 


ABDOMEN: Soft, nontender, nondistended, normoactive bowel sounds. No palpable 

organomegaly. 


MUSCULOSKELETAL: No joint swelling or deformity.


EXTREMITIES: No cyanosis, clubbing, or pedal edema. 


NEUROLOGICAL: Gross neurological examination did not reveal any focal deficits. 


SKIN: No rashes. 








Assessment and Plan


Plan: 


-New-onset atrial fibrillation: Patient is on anticoagulation. will need to be 

switched to oral patient was started on metoprolol.  


-Chest pain: Probably secondary to atrial fibrillation cardiac enzymes are 

essentially within normal limits.


-Hypertension continue with amlodipine


-Rheumatoid arthritis


-dyslipidemia


-History of CVA in the past





Patient Condition at Discharge: Stable





Plan - Discharge Summary


Discharge Rx Participant: Yes


New Discharge Prescriptions: 


New


   Apixaban [Eliquis] 5 mg PO BID #60 tab


   Atorvastatin [Lipitor] 20 mg PO QAM #30 tab


   Metoprolol Tartrate [Lopressor] 25 mg PO BID #60 tab





Continue


   Ibuprofen [Motrin] 800 mg PO TID PRN


     PRN Reason: Pain


   Folic Acid 800 mg PO DAILY


   amLODIPine [Norvasc] 5 mg PO DAILY


   Aspirin 325 mg PO DAILY


   Methotrexate/Pf [Rasuvo 7.5 mg/0.15 ml Autoinj] 15 mg SQ WE


   Lutein 10 mg PO DAILY


   Latanoprost/Pf [Latanoprost 0.005% Eye Drop] 1 drop BOTH EYES HS


   predniSONE 5 mg PO DAILY


Discharge Medication List





Aspirin 325 mg PO DAILY 06/16/14 [History]


Folic Acid 800 mg PO DAILY 06/16/14 [History]


Ibuprofen [Motrin] 800 mg PO TID PRN 06/16/14 [History]


amLODIPine [Norvasc] 5 mg PO DAILY 06/16/14 [History]


Latanoprost/Pf [Latanoprost 0.005% Eye Drop] 1 drop BOTH EYES HS 09/16/18 [

History]


Lutein 10 mg PO DAILY 09/16/18 [History]


Methotrexate/Pf [Rasuvo 7.5 mg/0.15 ml Autoinj] 15 mg SQ WE 09/16/18 [History]


predniSONE 5 mg PO DAILY 09/16/18 [History]


Apixaban [Eliquis] 5 mg PO BID #60 tab 09/17/18 [Rx]


Atorvastatin [Lipitor] 20 mg PO QAM #30 tab 09/17/18 [Rx]


Metoprolol Tartrate [Lopressor] 25 mg PO BID #60 tab 09/17/18 [Rx]








Follow up Appointment(s)/Referral(s): 


Emma Lovell MD [STAFF PHYSICIAN] - 2 Weeks (Office will call with follow up 

appointment.)


Vamsi Smith MD [Primary Care Provider] - 09/25/18 9:30 am (Follow-up with Dr. Sosa/Macie Malagon- previous cardiologist is Dr. Lovell, verified by office.)


Patient Instructions/Handouts:  A-fib (Atrial Fibrillation) (DC), Safe Use of 

Anticoagulants (DC)


Activity/Diet/Wound Care/Special Instructions: 


pt monthly copay for Eliquis is $43.50

## 2018-09-17 NOTE — ECHOF
Referral Reason:chest pain



MEASUREMENTS

--------

HEIGHT: 180.3 cm

WEIGHT: 112.0 kg

BP: 150/75

RVIDd:   3.4 cm     (< 3.3)

IVSd:   1.1 cm     (0.6 - 1.1)

LVIDd:   4.7 cm     (3.9 - 5.3)

LVPWd:   1.2 cm     (0.6 - 1.1)

IVSs:   1.8 cm

LVIDs:   2.9 cm

LVPWs:   1.5 cm

Ao Diam:   3.8 cm     (2.0 - 3.7)

AV Cusp:   2.0 cm     (1.5 - 2.6)

LA Diam:   3.6 cm     (2.7 - 3.8)

MV EXCURSION:   14.447 mm     (> 18.000)

MV EF SLOPE:   79 mm/s     (70 - 150)

EPSS:   0.5 cm

MV E Balbir:   0.68 m/s

MV DecT:   275 ms

MV A Balbir:   0.69 m/s

MV E/A Ratio:   0.98 







FINDINGS

--------

Sinus rhythm.

This was a technically difficult study with suboptimal views.

The left ventricular size is normal.   Left ventricular wall thickness is normal.   Overall left vent
ricular systolic function is normal with, an EF between 55 - 60 %.

The right ventricle is mildly enlarged.

The left atrial size is normal.

The right atrium is normal in size.

Lumason used

The aortic valve is trileaflet and appears structurally normal.

There is trace mitral regurgitation.

Trace tricuspid regurgitation present.   The right ventricular systolic pressure, as measured by Dopp
ler, is {RVSP}.

Pulmonic valve appears structurally normal.

The aortic root size is normal.

Normal inferior vena cava with normal inspiratory collapse consistent with estimated right atrial pre
ssure of  5 mmHg.

The pericardium is normal.



CONCLUSIONS

--------

1. Sinus rhythm.

2. This was a technically difficult study with suboptimal views.

3. The left ventricular size is normal.

4. Left ventricular wall thickness is normal.

5. Overall left ventricular systolic function is normal with, an EF between 55 - 60 %.

6. The left atrial size is normal.

7. The right atrium is normal in size.

8. Lumason used

9. The aortic valve is trileaflet and appears structurally normal.

10. There is trace mitral regurgitation.

11. Trace tricuspid regurgitation present.

12. The right ventricular systolic pressure, as measured by Doppler, is {RVSP}.

13. Pulmonic valve appears structurally normal.

14. The aortic root size is normal.

15. Normal inferior vena cava with normal inspiratory collapse consistent with estimated right atrial
 pressure of  5 mmHg.

16. The pericardium is normal.





SONOGRAPHER: Taylor Dong RDCS

## 2018-12-10 ENCOUNTER — HOSPITAL ENCOUNTER (OUTPATIENT)
Dept: HOSPITAL 47 - CATHCVL | Age: 78
LOS: 1 days | Discharge: HOME | End: 2018-12-11
Payer: MEDICARE

## 2018-12-10 VITALS — BODY MASS INDEX: 33.2 KG/M2

## 2018-12-10 DIAGNOSIS — I10: ICD-10-CM

## 2018-12-10 DIAGNOSIS — I25.10: Primary | ICD-10-CM

## 2018-12-10 DIAGNOSIS — Z79.01: ICD-10-CM

## 2018-12-10 DIAGNOSIS — R00.1: ICD-10-CM

## 2018-12-10 DIAGNOSIS — Z79.899: ICD-10-CM

## 2018-12-10 DIAGNOSIS — E78.5: ICD-10-CM

## 2018-12-10 DIAGNOSIS — R94.39: ICD-10-CM

## 2018-12-10 DIAGNOSIS — M06.9: ICD-10-CM

## 2018-12-10 DIAGNOSIS — I48.0: ICD-10-CM

## 2018-12-10 DIAGNOSIS — Z88.0: ICD-10-CM

## 2018-12-10 DIAGNOSIS — Z88.8: ICD-10-CM

## 2018-12-10 DIAGNOSIS — E78.00: ICD-10-CM

## 2018-12-10 DIAGNOSIS — Z79.52: ICD-10-CM

## 2018-12-10 DIAGNOSIS — Z79.82: ICD-10-CM

## 2018-12-10 DIAGNOSIS — J44.9: ICD-10-CM

## 2018-12-10 DIAGNOSIS — Z72.0: ICD-10-CM

## 2018-12-10 PROCEDURE — 93458 L HRT ARTERY/VENTRICLE ANGIO: CPT

## 2018-12-10 PROCEDURE — 85347 COAGULATION TIME ACTIVATED: CPT

## 2018-12-10 PROCEDURE — 80048 BASIC METABOLIC PNL TOTAL CA: CPT

## 2018-12-10 RX ADMIN — SULFAMETHOXAZOLE AND TRIMETHOPRIM SCH EACH: 800; 160 TABLET ORAL at 20:18

## 2018-12-10 RX ADMIN — HEPARIN SODIUM ONE UNIT: 1000 INJECTION, SOLUTION INTRAVENOUS; SUBCUTANEOUS at 08:20

## 2018-12-10 RX ADMIN — HEPARIN SODIUM ONE UNIT: 1000 INJECTION, SOLUTION INTRAVENOUS; SUBCUTANEOUS at 08:13

## 2018-12-10 RX ADMIN — METOPROLOL TARTRATE SCH MG: 25 TABLET, FILM COATED ORAL at 20:18

## 2018-12-10 NOTE — CC
CARDIAC CATHETERIZATION REPORT



Mr. Hadley is a 78-year-old male known history of hypertension, hyperlipidemia,

paroxysmal fibrillation followed by Dr. Florez who has been complaining of progressive

dyspnea and chest heaviness, underwent a myocardial perfusion imaging that was

abnormal.  In view of that, recommendation made regarding cardiac catheterization.  The

procedure, as well as  risks and complications were discussed with the patient who is

in full understanding and agreement.



PROCEDURE:

Patient was brought to cath lab in a fasting state after receiving fentanyl and

Benadryl and achieving moderate conscious sedated state.  Using Xylocaine anesthesia

and Seldinger technique, a 6-Greek sheath was introduced in the right radial artery.

Selective right and left coronary angiography performed using 5-Greek 3 and half bend

right Tom and a 4 bend left Tom catheter.  Images of the coronary artery

including hemiaxial views obtained. Following that angioplasty and stenting was

performed.  Following that 5-Greek tight pigtail catheter in his left ventricle and

pressures were calculated.  Following that the catheter and sheaths were removed.

Hemostasis was obtained with deployment of a TR band.  There was no immediate

complication.  Patient is returned to his room in stable condition.



FINDINGS:

FLUOROSCOPY: There was calcification involving the proximal left anterior descending

artery.

1. Left main:  This is a short size vessel bifurcating left circumflex, left anterior

    descending artery left main coronary artery has no evidence of high-grade stenosis.

2. Left anterior descending coronary artery:  This is a large-sized vessel reaching to

    the apex with a wraparound apex segment giving rise to a diagonal branch

    proximally.  The left anterior descending artery proximally is tortuous, has an

    acute takeoff out of the left main and has a 95% stenosis in the proximal segment.

    The rest of the vessel has no high-grade stenosis.

3. Left circumflex:  This is a nondominant vessel giving rise to a large obtuse

    marginal branch.  The left circumflex as well as branches have no evidence of

    obstructive coronary disease.

4. RIGHT CORONARY ARTERY:  This is a dominant vessel moderate in caliber bifurcating

    distally to the PDA and posterolateral segment and branches.  The right coronary

    artery as well as branches have no evidence of obstructive disease.

5. Left ventriculogram: Left ventriculogram was not performed.

6. Hemodynamics:  There was no gradient across the aortic valve.  The left ventricle

    end-diastolic pressure was 12 mmHg.



CONCLUSION:

1. Critical stenosis in the proximal calcified left anterior descending artery.

2. No evidence of obstructive disease in the left circumflex and the right coronary

    artery.



RECOMMENDATION:

In view of findings and anatomy, recommend proceeding with angioplasty and stenting.

The procedure,  risks and complications were discussed with the patient who is in full

understanding and agreement.





CHICHI / AMORN: [id number] / Job#: [id number]

## 2018-12-10 NOTE — PTCA
PERCUTANEOUSTRANS CORORONARY ANGIOGRAPHY



Mr. Hadley is a 78-year-old male who presented with symptoms of chest discomfort

and had an abnormal myocardial perfusion imaging.  Underwent cardiac catheterization

that revealed critical stenosis involving the proximal calcified LAD.  In view of that,

recommendation made regarding angioplasty and stenting.  The procedures as well as

risks and complications were discussed with the patient who is in full understanding

and agreement.



PROCEDURE:

A 6-Burmese EBU 3.75 guiding catheter was introduced into the system.  After cannulating

the left main, a 0.014 balanced medium weight J-wire was advanced across the LAD,

positioned in the diagonal branch.  There was difficulty advancing the wire into the

LAD because of the severe tortuosity.  Following that, an attempt to advance a whisper

J-wire in the LAD were unsuccessful.  Subsequently a 2.5 x 12 mm Trek balloon was

advanced and inflations at 8 atmospheres was done.  Following that, the balloon was

removed and a 3.0 x 15 mm Xience Tisha stent was deployed. It was dilated to 16

atmospheres.  After the last inflation, after appropriate wait, the balloon and the

guidewire were withdrawn back in the guiding catheter.  Images were obtained repeated.

Those images reveal stable successful stenting.  At that point, the guiding catheter,

the balloon and the guidewire were removed and a 6-Burmese tight pigtail catheter was

introduced in the left ventricle and pressures were calculated.  Following that the

catheter and sheaths were removed.  Hemostasis was obtained with deployment of a TR

band.  There was no immediate complication.  Patient is returned to his room in stable

condition.  Of note, the patient had chest discomfort and EKG changes with the

inflation that resolved at the end of the procedure.  He received 12,000 units of

intravenous heparin as well as intra-arterial verapamil and a loading dose of

clopidogrel.



RESULTS:

Successful stenting of the proximal LAD with reduction of stenosis from 95% to 0%.



RECOMMENDATION:

Patient be continued on aspirin, Plavix, beta blocker, and statin.  The importance of

dual antiplatelet treatment was discussed with the patient and his family who are in

full understanding and agreement.





MMNELLYL / IJN: [id number] / Job#: [id number]

## 2018-12-11 VITALS — HEART RATE: 60 BPM | SYSTOLIC BLOOD PRESSURE: 138 MMHG | TEMPERATURE: 98.2 F | DIASTOLIC BLOOD PRESSURE: 74 MMHG

## 2018-12-11 VITALS — RESPIRATION RATE: 19 BRPM

## 2018-12-11 LAB
ANION GAP SERPL CALC-SCNC: 8 MMOL/L
BUN SERPL-SCNC: 21 MG/DL (ref 9–20)
CALCIUM SPEC-MCNC: 9 MG/DL (ref 8.4–10.2)
CHLORIDE SERPL-SCNC: 107 MMOL/L (ref 98–107)
CO2 SERPL-SCNC: 25 MMOL/L (ref 22–30)
GLUCOSE SERPL-MCNC: 95 MG/DL (ref 74–99)
POTASSIUM SERPL-SCNC: 5 MMOL/L (ref 3.5–5.1)
SODIUM SERPL-SCNC: 140 MMOL/L (ref 137–145)

## 2018-12-11 RX ADMIN — METOPROLOL TARTRATE SCH MG: 25 TABLET, FILM COATED ORAL at 07:47

## 2018-12-11 RX ADMIN — SULFAMETHOXAZOLE AND TRIMETHOPRIM SCH EACH: 800; 160 TABLET ORAL at 07:47

## 2018-12-11 NOTE — PN
PROGRESS NOTE



Mr. Hadley is a 78-year-old male who presented with symptoms of chest discomfort,

abnormal myocardial perfusion imaging, underwent cardiac catheterization, was found to

have critical stenosis involving the proximal LAD, underwent stenting of that vessel.

He is doing well this morning, ambulating without difficulty.  Denying any chest pain.

No dizziness.  No palpitation.  He continued to be on aspirin 81 mg daily, Lipitor 80

mg daily, Plavix 75 mg daily, metoprolol tartrate 25 mg twice a day.



PHYSICAL EXAMINATION:

Blood pressure 138/70 with the heart rate in 60s.

LUNGS:  Clear.

HEART:  Regular rate and rhythm. S1, S2.  No S3.  No rub.

ABDOMEN:  Soft, nontender.

EXTREMITIES:  No edema. Right radial pulse intact.



EKG no acute changes.  BUN and creatinine 21 and 1.22. Potassium 5.0.



IMPRESSION:

1. Status post stenting of the left anterior descending artery.

2. Hypertension.

3. Hyperlipidemia.



RECOMMENDATION:

Patient will be discharged home today and followed as an outpatient by Dr. Florez.





MMCRISTOPHER / AMORN: [id number] / Job#: [id number]

## 2023-08-08 NOTE — P.PN
Subjective


Progress Note Date: 09/17/18


This is a 77-year-old  gentleman who was seen in consultation 

yesterday by Dr. Florez.  Presented to the hospital with symptoms of newly 

diagnosed age of fibrillation with rapid ventricular response.  He also has a 

known history of hypertension, hyperlipidemia, and prior CVA.  Patient was seen 

and examined today, doing well overall.  In normal sinus rhythm with a heart 

rate in the 60s today.  We will discontinue his heparin and start the patient 

today on Eliquis 5 mg one tablet by mouth twice a day, we did check to make 

sure the patient has coverage, he will cost him $43 a month and he is willing 

to be on that.  Hemodynamically he is stable today.








Objective





- Vital Signs


Vital signs: 


 Vital Signs











Temp  97.4 F L  09/17/18 07:35


 


Pulse  54 L  09/17/18 11:44


 


Resp  17   09/17/18 11:44


 


BP  133/76   09/17/18 11:44


 


Pulse Ox  96   09/17/18 11:44








 Intake & Output











 09/16/18 09/17/18 09/17/18





 18:59 06:59 18:59


 


Intake Total 868.613 363.491 240


 


Output Total  950 


 


Balance 868.613 -586.509 240


 


Weight  112.2 kg 


 


Intake:   


 


  Intake, IV Titration 388.613 363.491 





  Amount   


 


    Heparin Sod,Pork in 0.45% 388.613 363.491 





    NaCl 25,000 unit In 0.45   





    % NaCl 1 500ml.bag @ 8.3   





    UNITS/KG/HR 19.95 mls/hr   





    IV .Q24H [facility] Rx#:   





    [id number]   


 


  Oral 480  240


 


Output:   


 


  Urine  950 


 


Other:   


 


  Voiding Method  Toilet Toilet





  Urinal Urinal


 


  # Voids 3 1 














- Exam





PHYSICAL EXAMINATION: 





GENERAL: 77-year-old  gentleman in no acute distress at the time of my 

examination





HEENT: Head is atraumatic, normocephalic.  Pupils equal, round.  Sclera 

anicteric. Conjunctiva are clear.  Mucous membranes of the mouth are moist.  

Neck is supple.  There is no elevated jugular venous pressure.]  bruit is heard.





HEART EXAMINATION: Heart S1, S2 normal.  No murmur or gallop heard.





CHEST EXAMINATION: Lungs are clear to auscultation and precussion. No chest 

wall tenderness is noted on palpation or with deep breathing.





ABDOMEN:  Soft, nontender. Bowel sounds are heard. No organomegaly noted.


 


EXTREMITIES: 2+ peripheral pulses with no evidence of peripheral edema and no 

calf tenderness noted.





NEUROLOGIC patient is awake, alert and oriented ?-3.


 


.


 








- Labs


CBC & Chem 7: 


 09/17/18 06:02





 09/17/18 06:02


Labs: 


 Abnormal Lab Results - Last 24 Hours (Table)











  09/16/18 09/16/18 09/17/18 Range/Units





  18:05 18:06 06:02 


 


MCV    100.7 H  (80.0-100.0)  fL


 


APTT     (22.0-30.0)  sec


 


BUN     (9-20)  mg/dL


 


POC Glucose (mg/dL)   105 H   (75-99)  mg/dL


 


Urine Blood  Small H    (Negative)  


 


Urine Mucus  Rare H    (None)  /hpf














  09/17/18 09/17/18 Range/Units





  06:02 06:02 


 


MCV    (80.0-100.0)  fL


 


APTT   35.6 H  (22.0-30.0)  sec


 


BUN  23 H   (9-20)  mg/dL


 


POC Glucose (mg/dL)    (75-99)  mg/dL


 


Urine Blood    (Negative)  


 


Urine Mucus    (None)  /hpf














Assessment and Plan


Plan: 


Assessment and plan


#1 paroxysmal atrial fibrillation, Eliquis has been initiated today for 

anticoagulation.


#2 hypertension


#3 hyperlipidemia


#4 history of CVA








Plan





Discontinue the IV heparin today and start the patient on Eliquis milligrams 

one tablet by mouth twice a day.  Patient may be able to be discharged home.  We

'll make him a follow-up appointment in the office to see Dr. Florez in 3 

weeks.








DNP note has been reviewed, I agree with a documented findings and plan of 

care.  Patient was seen and examined.
yes